# Patient Record
Sex: MALE | URBAN - METROPOLITAN AREA
[De-identification: names, ages, dates, MRNs, and addresses within clinical notes are randomized per-mention and may not be internally consistent; named-entity substitution may affect disease eponyms.]

---

## 2017-10-16 ENCOUNTER — APPOINTMENT (RX ONLY)
Age: 27
Setting detail: DERMATOLOGY
End: 2017-10-16

## 2017-10-16 DIAGNOSIS — L80 VITILIGO: ICD-10-CM

## 2017-10-16 DIAGNOSIS — D22 MELANOCYTIC NEVI: ICD-10-CM

## 2017-10-16 PROBLEM — D22.5 MELANOCYTIC NEVI OF TRUNK: Status: ACTIVE | Noted: 2017-10-16

## 2017-10-16 PROCEDURE — ? COUNSELING

## 2017-10-16 PROCEDURE — 99203 OFFICE O/P NEW LOW 30 MIN: CPT

## 2017-10-16 PROCEDURE — ? OBSERVATION

## 2017-10-16 PROCEDURE — ? DEFER

## 2017-10-16 PROCEDURE — ? PATIENT SPECIFIC COUNSELING

## 2017-10-16 ASSESSMENT — LOCATION DETAILED DESCRIPTION DERM
LOCATION DETAILED: PERIUMBILICAL SKIN
LOCATION DETAILED: SUPRAPUBIC SKIN
LOCATION DETAILED: LEFT PROXIMAL PRETIBIAL REGION
LOCATION DETAILED: PERIUMBILICAL SKIN

## 2017-10-16 ASSESSMENT — LOCATION SIMPLE DESCRIPTION DERM
LOCATION SIMPLE: ABDOMEN
LOCATION SIMPLE: GROIN
LOCATION SIMPLE: ABDOMEN
LOCATION SIMPLE: LEFT PRETIBIAL REGION

## 2017-10-16 ASSESSMENT — LOCATION ZONE DERM
LOCATION ZONE: LEG
LOCATION ZONE: TRUNK
LOCATION ZONE: TRUNK

## 2017-10-16 NOTE — HPI: RASH
How Severe Is Your Rash?: moderate
Is This A New Presentation, Or A Follow-Up?: Rash
Additional History: Patient is also here for a full skin exam

## 2017-10-16 NOTE — PROCEDURE: PATIENT SPECIFIC COUNSELING
Detail Level: Detailed
I have discussed excimer laser, the potential risk of skin cancer and the lack of data for excimer specifically. The risk is lower than other forms of UV because the only sights that are treated are the affected sites. \\n\\nI recommend being screened for diabetes and thyroid disease as Vitiligo increases risk of other autoimmune disorders.

## 2017-10-19 ENCOUNTER — APPOINTMENT (RX ONLY)
Age: 27
Setting detail: DERMATOLOGY
End: 2017-10-19

## 2017-10-19 DIAGNOSIS — L80 VITILIGO: ICD-10-CM

## 2017-10-19 PROCEDURE — ? XTRAC LASER

## 2017-10-19 PROCEDURE — 96922 EXCIMER LSR PSRIASIS>500SQCM: CPT

## 2017-10-19 ASSESSMENT — LOCATION ZONE DERM
LOCATION ZONE: TRUNK
LOCATION ZONE: LEG
LOCATION ZONE: PENIS
LOCATION ZONE: GENITALIA

## 2017-10-19 ASSESSMENT — LOCATION SIMPLE DESCRIPTION DERM
LOCATION SIMPLE: RIGHT THIGH
LOCATION SIMPLE: SCROTUM
LOCATION SIMPLE: GROIN
LOCATION SIMPLE: PENIS
LOCATION SIMPLE: LEFT PRETIBIAL REGION
LOCATION SIMPLE: RIGHT PRETIBIAL REGION

## 2017-10-19 ASSESSMENT — LOCATION DETAILED DESCRIPTION DERM
LOCATION DETAILED: LEFT DISTAL PRETIBIAL REGION
LOCATION DETAILED: RIGHT ANTERIOR PROXIMAL THIGH
LOCATION DETAILED: RIGHT DORSAL SHAFT OF PENIS
LOCATION DETAILED: SUPRAPUBIC SKIN
LOCATION DETAILED: RIGHT DISTAL PRETIBIAL REGION
LOCATION DETAILED: RIGHT ANTERIOR SCROTUM

## 2017-10-19 NOTE — PROCEDURE: XTRAC LASER
Detail Level: Simple
Patient Id: [id number]
Treatment Number: 1
Dose Setting #2 (Mj/Cm2): 200
Total Square Area In Cm2 (Required For Proper Billing): 606
Total Pulses (Will Not Render If 0): 0
Total Energy In Joules: 120.80
Location #1: legs
Post-Care Instructions: I reviewed with the patient in detail post-care instructions. Patient should stay away from the sun and wear sun protection until treated areas are fully healed.
Consent: see scanned.
Location #2: groin

## 2017-10-23 ENCOUNTER — APPOINTMENT (RX ONLY)
Age: 27
Setting detail: DERMATOLOGY
End: 2017-10-23

## 2017-10-23 DIAGNOSIS — L80 VITILIGO: ICD-10-CM

## 2017-10-23 PROCEDURE — 96920 EXCIMER LSR PSRIASIS<250SQCM: CPT

## 2017-10-23 PROCEDURE — ? XTRAC LASER

## 2017-10-23 ASSESSMENT — LOCATION DETAILED DESCRIPTION DERM
LOCATION DETAILED: LEFT DISTAL PRETIBIAL REGION
LOCATION DETAILED: RIGHT ANTERIOR PROXIMAL THIGH
LOCATION DETAILED: RIGHT DISTAL PRETIBIAL REGION

## 2017-10-23 ASSESSMENT — LOCATION SIMPLE DESCRIPTION DERM
LOCATION SIMPLE: LEFT PRETIBIAL REGION
LOCATION SIMPLE: RIGHT THIGH
LOCATION SIMPLE: RIGHT PRETIBIAL REGION

## 2017-10-23 ASSESSMENT — LOCATION ZONE DERM: LOCATION ZONE: LEG

## 2017-10-23 NOTE — PROCEDURE: XTRAC LASER
Consent: see scanned.
Detail Level: Simple
Post-Care Instructions: I reviewed with the patient in detail post-care instructions. Patient should stay away from the sun and wear sun protection until treated areas are fully healed.
Dose Setting #1 (Mj/Cm2): 250
Total Square Area In Cm2 (Required For Proper Billing): 192
Total Energy In Joules: 48
Total Pulses (Will Not Render If 0): 0
Location #1: legs
Treatment Number: 2
Patient Id: [id number]

## 2017-11-09 ENCOUNTER — APPOINTMENT (RX ONLY)
Age: 27
Setting detail: DERMATOLOGY
End: 2017-11-09

## 2017-11-09 DIAGNOSIS — L80 VITILIGO: ICD-10-CM

## 2017-11-09 PROCEDURE — ? XTRAC LASER

## 2017-11-09 PROCEDURE — 96921 EXCIMER LSR PSRIASIS 250-500: CPT

## 2017-11-09 ASSESSMENT — LOCATION ZONE DERM
LOCATION ZONE: GENITALIA
LOCATION ZONE: LEG

## 2017-11-09 ASSESSMENT — LOCATION SIMPLE DESCRIPTION DERM
LOCATION SIMPLE: RIGHT PRETIBIAL REGION
LOCATION SIMPLE: LEFT PRETIBIAL REGION
LOCATION SIMPLE: RIGHT THIGH
LOCATION SIMPLE: SCROTUM

## 2017-11-09 ASSESSMENT — LOCATION DETAILED DESCRIPTION DERM
LOCATION DETAILED: RIGHT DISTAL PRETIBIAL REGION
LOCATION DETAILED: RIGHT ANTERIOR PROXIMAL THIGH
LOCATION DETAILED: RIGHT ANTERIOR SCROTUM
LOCATION DETAILED: LEFT DISTAL PRETIBIAL REGION

## 2017-11-09 NOTE — PROCEDURE: XTRAC LASER
Consent: see scanned.
Patient Id: [id number]
Total Energy In Joules: 131.20
Treatment Number: 3
Total Square Area In Cm2 (Required For Proper Billing): 483
Post-Care Instructions: I reviewed with the patient in detail post-care instructions. Patient should stay away from the sun and wear sun protection until treated areas are fully healed.
Dose Setting #1 (Mj/Cm2): 300
Location #2: groin
Dose Setting #2 (Mj/Cm2): 250
Detail Level: Simple
Total Pulses (Will Not Render If 0): 0
Location #1: legs

## 2017-11-13 ENCOUNTER — APPOINTMENT (RX ONLY)
Age: 27
Setting detail: DERMATOLOGY
End: 2017-11-13

## 2017-11-13 DIAGNOSIS — B07.8 OTHER VIRAL WARTS: ICD-10-CM

## 2017-11-13 DIAGNOSIS — R21 RASH AND OTHER NONSPECIFIC SKIN ERUPTION: ICD-10-CM

## 2017-11-13 DIAGNOSIS — L80 VITILIGO: ICD-10-CM

## 2017-11-13 PROCEDURE — ? XTRAC LASER

## 2017-11-13 PROCEDURE — 87220 TISSUE EXAM FOR FUNGI: CPT

## 2017-11-13 PROCEDURE — 96921 EXCIMER LSR PSRIASIS 250-500: CPT

## 2017-11-13 PROCEDURE — ? DIAGNOSIS COMMENT

## 2017-11-13 PROCEDURE — ? KOH PREP

## 2017-11-13 PROCEDURE — ? COUNSELING

## 2017-11-13 PROCEDURE — 99214 OFFICE O/P EST MOD 30 MIN: CPT | Mod: 25

## 2017-11-13 PROCEDURE — ? PATIENT SPECIFIC COUNSELING

## 2017-11-13 PROCEDURE — ? PRESCRIPTION

## 2017-11-13 PROCEDURE — ? OBSERVATION

## 2017-11-13 ASSESSMENT — AREA OF WARTS IN CM2: TOTAL AREA OF ALL WARTS IN CM2: 1

## 2017-11-13 ASSESSMENT — LOCATION ZONE DERM
LOCATION ZONE: GENITALIA
LOCATION ZONE: LEG
LOCATION ZONE: FEET
LOCATION ZONE: TRUNK

## 2017-11-13 ASSESSMENT — LOCATION SIMPLE DESCRIPTION DERM
LOCATION SIMPLE: SCROTUM
LOCATION SIMPLE: LEFT SOLE
LOCATION SIMPLE: RIGHT THIGH
LOCATION SIMPLE: LEFT PRETIBIAL REGION
LOCATION SIMPLE: ABDOMEN
LOCATION SIMPLE: RIGHT PRETIBIAL REGION

## 2017-11-13 ASSESSMENT — LOCATION DETAILED DESCRIPTION DERM
LOCATION DETAILED: PERIUMBILICAL SKIN
LOCATION DETAILED: LEFT DISTAL PRETIBIAL REGION
LOCATION DETAILED: RIGHT ANTERIOR PROXIMAL THIGH
LOCATION DETAILED: RIGHT ANTERIOR SCROTUM
LOCATION DETAILED: RIGHT DISTAL PRETIBIAL REGION
LOCATION DETAILED: LEFT SOLE

## 2017-11-13 ASSESSMENT — TOTAL NUMBER OF VERRUCA VULGARIS: # OF LESIONS?: 1

## 2017-11-13 NOTE — PROCEDURE: XTRAC LASER
Total Pulses (Will Not Render If 0): 0
Total Energy In Joules: 140.40
Patient Id: [id number]
Detail Level: Simple
Post-Care Instructions: I reviewed with the patient in detail post-care instructions. Patient should stay away from the sun and wear sun protection until treated areas are fully healed.
Dose Setting #1 (Mj/Cm2): 400
Dose Setting #2 (Mj/Cm2): 250
Consent: see scanned.
Total Square Area In Cm2 (Required For Proper Billing): 432
Location #1: legs
Location #2: groin
Treatment Number: 4

## 2017-11-13 NOTE — PROCEDURE: DIAGNOSIS COMMENT
Detail Level: Detailed
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 He will use Calamine lotion topically to soothe the skin.  He will call me (cell phone #) provided if he worsens, develops any new symptoms or systemic signs such as fever.

## 2017-11-14 RX ORDER — PREDNISONE 10 MG/1
TABLET ORAL
Qty: 84 | Refills: 0 | Status: ERX | COMMUNITY
Start: 2017-11-14

## 2017-11-14 RX ADMIN — PREDNISONE: 10 TABLET ORAL at 00:35

## 2017-11-16 ENCOUNTER — APPOINTMENT (RX ONLY)
Age: 27
Setting detail: DERMATOLOGY
End: 2017-11-16

## 2017-11-16 DIAGNOSIS — L80 VITILIGO: ICD-10-CM

## 2017-11-16 PROCEDURE — 96921 EXCIMER LSR PSRIASIS 250-500: CPT

## 2017-11-16 PROCEDURE — ? XTRAC LASER

## 2017-11-16 ASSESSMENT — LOCATION SIMPLE DESCRIPTION DERM
LOCATION SIMPLE: RIGHT THIGH
LOCATION SIMPLE: LEFT PRETIBIAL REGION
LOCATION SIMPLE: SCROTUM
LOCATION SIMPLE: RIGHT PRETIBIAL REGION

## 2017-11-16 ASSESSMENT — LOCATION ZONE DERM
LOCATION ZONE: LEG
LOCATION ZONE: GENITALIA

## 2017-11-16 ASSESSMENT — LOCATION DETAILED DESCRIPTION DERM
LOCATION DETAILED: LEFT DISTAL PRETIBIAL REGION
LOCATION DETAILED: RIGHT ANTERIOR SCROTUM
LOCATION DETAILED: RIGHT ANTERIOR PROXIMAL THIGH
LOCATION DETAILED: RIGHT DISTAL PRETIBIAL REGION

## 2017-11-16 NOTE — PROCEDURE: XTRAC LASER
Dose Setting #1 (Mj/Cm2): 450
Consent: see scanned.
Total Pulses (Will Not Render If 0): 0
Location #2: groin
Dose Setting #2 (Mj/Cm2): 250
Total Square Area In Cm2 (Required For Proper Billing): 364
Patient Id: [id number]
Treatment Number: 5
Detail Level: Simple
Post-Care Instructions: I reviewed with the patient in detail post-care instructions. Patient should stay away from the sun and wear sun protection until treated areas are fully healed.
Total Energy In Joules: 169
Location #1: legs

## 2017-11-21 ENCOUNTER — APPOINTMENT (RX ONLY)
Age: 27
Setting detail: DERMATOLOGY
End: 2017-11-21

## 2017-11-21 DIAGNOSIS — L80 VITILIGO: ICD-10-CM

## 2017-11-21 PROCEDURE — 96921 EXCIMER LSR PSRIASIS 250-500: CPT

## 2017-11-21 PROCEDURE — ? XTRAC LASER

## 2017-11-21 ASSESSMENT — LOCATION ZONE DERM
LOCATION ZONE: GENITALIA
LOCATION ZONE: LEG

## 2017-11-21 ASSESSMENT — LOCATION DETAILED DESCRIPTION DERM
LOCATION DETAILED: RIGHT ANTERIOR PROXIMAL THIGH
LOCATION DETAILED: RIGHT DISTAL PRETIBIAL REGION
LOCATION DETAILED: RIGHT ANTERIOR SCROTUM
LOCATION DETAILED: LEFT DISTAL PRETIBIAL REGION

## 2017-11-21 NOTE — PROCEDURE: XTRAC LASER
Total Energy In Joules: 150.60
Total Square Area In Cm2 (Required For Proper Billing): 372
Treatment Number: 6
Total Pulses (Will Not Render If 0): 0
Post-Care Instructions: I reviewed with the patient in detail post-care instructions. Patient should stay away from the sun and wear sun protection until treated areas are fully healed.
Location #2: groin
Dose Setting #1 (Mj/Cm2): 550
Location #1: legs
Patient Id: [id number]
Detail Level: Simple
Consent: see scanned.
Dose Setting #2 (Mj/Cm2): 250

## 2017-11-22 ENCOUNTER — APPOINTMENT (RX ONLY)
Age: 27
Setting detail: DERMATOLOGY
End: 2017-11-22

## 2017-11-22 DIAGNOSIS — L80 VITILIGO: ICD-10-CM

## 2017-11-22 PROCEDURE — ? XTRAC LASER

## 2017-11-22 PROCEDURE — 96921 EXCIMER LSR PSRIASIS 250-500: CPT

## 2017-11-22 ASSESSMENT — LOCATION ZONE DERM
LOCATION ZONE: GENITALIA
LOCATION ZONE: LEG

## 2017-11-22 ASSESSMENT — LOCATION DETAILED DESCRIPTION DERM
LOCATION DETAILED: RIGHT ANTERIOR PROXIMAL THIGH
LOCATION DETAILED: LEFT DISTAL PRETIBIAL REGION
LOCATION DETAILED: RIGHT DISTAL PRETIBIAL REGION
LOCATION DETAILED: RIGHT ANTERIOR SCROTUM

## 2017-11-22 ASSESSMENT — LOCATION SIMPLE DESCRIPTION DERM
LOCATION SIMPLE: RIGHT THIGH
LOCATION SIMPLE: SCROTUM
LOCATION SIMPLE: LEFT PRETIBIAL REGION
LOCATION SIMPLE: RIGHT PRETIBIAL REGION

## 2017-11-22 NOTE — PROCEDURE: XTRAC LASER
Dose Setting #3 (Mj/Cm2): 250
Dose Setting #2 (Mj/Cm2): 500
Location #3: groin
Location #2: suprapubic
Consent: see scanned.
Total Pulses (Will Not Render If 0): 0
Post-Care Instructions: I reviewed with the patient in detail post-care instructions. Patient should stay away from the sun and wear sun protection until treated areas are fully healed.
Patient Id: [id number]
Treatment Number: 7
Dose Setting #1 (Mj/Cm2): 550
Total Energy In Joules: 150.60
Detail Level: Simple
Location #1: legs
Total Square Area In Cm2 (Required For Proper Billing): 372

## 2017-11-30 ENCOUNTER — APPOINTMENT (RX ONLY)
Age: 27
Setting detail: DERMATOLOGY
End: 2017-11-30

## 2017-11-30 DIAGNOSIS — L80 VITILIGO: ICD-10-CM

## 2017-11-30 PROCEDURE — 96921 EXCIMER LSR PSRIASIS 250-500: CPT

## 2017-11-30 PROCEDURE — ? XTRAC LASER

## 2017-11-30 ASSESSMENT — LOCATION ZONE DERM
LOCATION ZONE: GENITALIA
LOCATION ZONE: LEG

## 2017-11-30 ASSESSMENT — LOCATION SIMPLE DESCRIPTION DERM
LOCATION SIMPLE: LEFT PRETIBIAL REGION
LOCATION SIMPLE: RIGHT PRETIBIAL REGION
LOCATION SIMPLE: SCROTUM
LOCATION SIMPLE: RIGHT THIGH

## 2017-11-30 ASSESSMENT — LOCATION DETAILED DESCRIPTION DERM
LOCATION DETAILED: RIGHT DISTAL PRETIBIAL REGION
LOCATION DETAILED: RIGHT ANTERIOR SCROTUM
LOCATION DETAILED: LEFT DISTAL PRETIBIAL REGION
LOCATION DETAILED: RIGHT ANTERIOR PROXIMAL THIGH

## 2017-11-30 NOTE — PROCEDURE: XTRAC LASER
Consent: see scanned.
Detail Level: Simple
Treatment Number: 8
Patient Id: [id number]
Dose Setting #2 (Mj/Cm2): 500
Post-Care Instructions: I reviewed with the patient in detail post-care instructions. Patient should stay away from the sun and wear sun protection until treated areas are fully healed.
Location #2: suprapubic
Location #1: legs
Total Pulses (Will Not Render If 0): 0
Total Square Area In Cm2 (Required For Proper Billing): 384
Total Energy In Joules: 138.00
Dose Setting #3 (Mj/Cm2): 200
Location #3: groin

## 2017-12-05 ENCOUNTER — APPOINTMENT (RX ONLY)
Age: 27
Setting detail: DERMATOLOGY
End: 2017-12-05

## 2017-12-05 DIAGNOSIS — L80 VITILIGO: ICD-10-CM

## 2017-12-05 PROCEDURE — 96921 EXCIMER LSR PSRIASIS 250-500: CPT

## 2017-12-05 PROCEDURE — ? XTRAC LASER

## 2017-12-05 ASSESSMENT — LOCATION SIMPLE DESCRIPTION DERM
LOCATION SIMPLE: LEFT PRETIBIAL REGION
LOCATION SIMPLE: SCROTUM
LOCATION SIMPLE: RIGHT THIGH
LOCATION SIMPLE: RIGHT PRETIBIAL REGION

## 2017-12-05 ASSESSMENT — LOCATION ZONE DERM
LOCATION ZONE: GENITALIA
LOCATION ZONE: LEG

## 2017-12-05 NOTE — PROCEDURE: XTRAC LASER
Dose Setting #3 (Mj/Cm2): 200
Treatment Number: 8
Location #2: suprapubic
Location #3: groin
Location #1: legs
Dose Setting #1 (Mj/Cm2): 550
Patient Id: [id number]
Consent: see scanned.
Total Energy In Joules: 182.20
Detail Level: Simple
Total Pulses (Will Not Render If 0): 0
Post-Care Instructions: I reviewed with the patient in detail post-care instructions. Patient should stay away from the sun and wear sun protection until treated areas are fully healed.
Total Square Area In Cm2 (Required For Proper Billing): 481

## 2017-12-07 ENCOUNTER — APPOINTMENT (RX ONLY)
Age: 27
Setting detail: DERMATOLOGY
End: 2017-12-07

## 2017-12-07 DIAGNOSIS — L80 VITILIGO: ICD-10-CM

## 2017-12-07 PROCEDURE — ? XTRAC LASER

## 2017-12-07 PROCEDURE — 96922 EXCIMER LSR PSRIASIS>500SQCM: CPT

## 2017-12-07 ASSESSMENT — LOCATION SIMPLE DESCRIPTION DERM
LOCATION SIMPLE: SCROTUM
LOCATION SIMPLE: RIGHT PRETIBIAL REGION
LOCATION SIMPLE: LEFT PRETIBIAL REGION
LOCATION SIMPLE: RIGHT THIGH

## 2017-12-07 ASSESSMENT — LOCATION DETAILED DESCRIPTION DERM
LOCATION DETAILED: LEFT DISTAL PRETIBIAL REGION
LOCATION DETAILED: RIGHT ANTERIOR SCROTUM
LOCATION DETAILED: RIGHT DISTAL PRETIBIAL REGION
LOCATION DETAILED: RIGHT ANTERIOR PROXIMAL THIGH

## 2017-12-07 ASSESSMENT — LOCATION ZONE DERM
LOCATION ZONE: GENITALIA
LOCATION ZONE: LEG

## 2017-12-07 NOTE — PROCEDURE: XTRAC LASER
Patient Id: [id number]
Dose Setting #2 (Mj/Cm2): 550
Detail Level: Simple
Dose Setting #3 (Mj/Cm2): 200
Total Pulses (Will Not Render If 0): 0
Post-Care Instructions: I reviewed with the patient in detail post-care instructions. Patient should stay away from the sun and wear sun protection until treated areas are fully healed.
Location #2: suprapubic
Total Energy In Joules: 194.00
Location #3: groin
Location #1: legs
Treatment Number: 10
Consent: see scanned.
Total Square Area In Cm2 (Required For Proper Billing): 501

## 2017-12-11 ENCOUNTER — APPOINTMENT (RX ONLY)
Age: 27
Setting detail: DERMATOLOGY
End: 2017-12-11

## 2017-12-11 DIAGNOSIS — L81.0 POSTINFLAMMATORY HYPERPIGMENTATION: ICD-10-CM

## 2017-12-11 DIAGNOSIS — L91.0 HYPERTROPHIC SCAR: ICD-10-CM

## 2017-12-11 DIAGNOSIS — L80 VITILIGO: ICD-10-CM | Status: IMPROVED

## 2017-12-11 DIAGNOSIS — R21 RASH AND OTHER NONSPECIFIC SKIN ERUPTION: ICD-10-CM | Status: RESOLVED

## 2017-12-11 PROCEDURE — ? XTRAC LASER

## 2017-12-11 PROCEDURE — ? COUNSELING

## 2017-12-11 PROCEDURE — ? OBSERVATION

## 2017-12-11 PROCEDURE — 96921 EXCIMER LSR PSRIASIS 250-500: CPT

## 2017-12-11 PROCEDURE — 11900 INJECT SKIN LESIONS </W 7: CPT

## 2017-12-11 PROCEDURE — 99214 OFFICE O/P EST MOD 30 MIN: CPT | Mod: 25

## 2017-12-11 PROCEDURE — ? INTRALESIONAL KENALOG

## 2017-12-11 ASSESSMENT — LOCATION SIMPLE DESCRIPTION DERM
LOCATION SIMPLE: LEFT THIGH
LOCATION SIMPLE: LEFT PRETIBIAL REGION
LOCATION SIMPLE: RIGHT PRETIBIAL REGION
LOCATION SIMPLE: ABDOMEN
LOCATION SIMPLE: RIGHT THIGH
LOCATION SIMPLE: LEFT BUTTOCK

## 2017-12-11 ASSESSMENT — LOCATION DETAILED DESCRIPTION DERM
LOCATION DETAILED: LEFT PROXIMAL PRETIBIAL REGION
LOCATION DETAILED: LEFT DISTAL PRETIBIAL REGION
LOCATION DETAILED: LEFT BUTTOCK
LOCATION DETAILED: RIGHT DISTAL PRETIBIAL REGION
LOCATION DETAILED: PERIUMBILICAL SKIN
LOCATION DETAILED: LEFT ANTERIOR PROXIMAL THIGH
LOCATION DETAILED: RIGHT ANTERIOR PROXIMAL THIGH

## 2017-12-11 ASSESSMENT — LOCATION ZONE DERM
LOCATION ZONE: LEG
LOCATION ZONE: TRUNK

## 2017-12-11 NOTE — PROCEDURE: INTRALESIONAL KENALOG
Concentration Of Kenalog Solution Injected (Mg/Ml): 5.0
Total Volume (Ccs): 0.1
Administered By (Optional): Xi Penaloza
Ndc# For Kenalog Only: 9486-3900-87
Detail Level: Detailed
Consent: The risks of atrophy were reviewed with the patient.
Medical Necessity Clause: This procedure was medically necessary because the lesions that were treated were:
X Size Of Lesion In Cm (Optional): 0
Include Z78.9 (Other Specified Conditions Influencing Health Status) As An Associated Diagnosis?: No
Kenalog Preparation: Kenalog with 1% lidocaine with epinephrine
Expiration Date For Kenalog (Optional): Aug 2018
Lot # For Kenalog (Optional): [id number]

## 2017-12-11 NOTE — PROCEDURE: OBSERVATION
Size Of Lesion In Cm (Optional): 0
Detail Level: Detailed
Body Location Override (Optional - Billing Will Still Be Based On Selected Body Map Location If Applicable): left distal pretibial region
Body Location Override (Optional - Billing Will Still Be Based On Selected Body Map Location If Applicable): right distal pretibial region
Detail Level: Zone
Body Location Override (Optional - Billing Will Still Be Based On Selected Body Map Location If Applicable): right anterior proximal thigh

## 2017-12-11 NOTE — PROCEDURE: XTRAC LASER
Dose Setting #2 (Mj/Cm2): 550
Location #1: legs
Treatment Number: 11
Consent: see scanned.
Total Square Area In Cm2 (Required For Proper Billing): 480
Total Pulses (Will Not Render If 0): 0
Post-Care Instructions: I reviewed with the patient in detail post-care instructions. Patient should stay away from the sun and wear sun protection until treated areas are fully healed.
Total Energy In Joules: 213.40
Dose Setting #1 (Mj/Cm2): 600
Patient Id: [id number]
Location #2: suprapubic
Dose Setting #3 (Mj/Cm2): 250
Location #3: groin
Detail Level: Simple

## 2017-12-18 ENCOUNTER — APPOINTMENT (RX ONLY)
Age: 27
Setting detail: DERMATOLOGY
End: 2017-12-18

## 2017-12-18 DIAGNOSIS — L80 VITILIGO: ICD-10-CM

## 2017-12-18 PROCEDURE — ? XTRAC LASER

## 2017-12-18 PROCEDURE — 96922 EXCIMER LSR PSRIASIS>500SQCM: CPT

## 2017-12-18 PROCEDURE — ? OBSERVATION

## 2017-12-18 ASSESSMENT — LOCATION ZONE DERM: LOCATION ZONE: LEG

## 2017-12-18 ASSESSMENT — LOCATION DETAILED DESCRIPTION DERM
LOCATION DETAILED: LEFT DISTAL PRETIBIAL REGION
LOCATION DETAILED: RIGHT ANTERIOR PROXIMAL THIGH
LOCATION DETAILED: LEFT PROXIMAL PRETIBIAL REGION
LOCATION DETAILED: RIGHT DISTAL PRETIBIAL REGION

## 2017-12-18 NOTE — PROCEDURE: XTRAC LASER
Detail Level: Simple
Location #2: suprapubic
Dose Setting #1 (Mj/Cm2): 600
Patient Id: [id number]
Consent: see scanned.
Post-Care Instructions: I reviewed with the patient in detail post-care instructions. Patient should stay away from the sun and wear sun protection until treated areas are fully healed.
Dose Setting #2 (Mj/Cm2): 500
Total Energy In Joules: 262.40
Total Pulses (Will Not Render If 0): 0
Location #1: legs
Total Square Area In Cm2 (Required For Proper Billing): 571
Dose Setting #3 (Mj/Cm2): 300
Treatment Number: 12
Location #3: groin

## 2017-12-18 NOTE — PROCEDURE: OBSERVATION
X Size Of Lesion In Cm (Optional): 0
Detail Level: Detailed
Body Location Override (Optional - Billing Will Still Be Based On Selected Body Map Location If Applicable): left distal pretibial region
Body Location Override (Optional - Billing Will Still Be Based On Selected Body Map Location If Applicable): right distal pretibial region
Body Location Override (Optional - Billing Will Still Be Based On Selected Body Map Location If Applicable): right anterior proximal thigh

## 2017-12-21 ENCOUNTER — APPOINTMENT (RX ONLY)
Age: 27
Setting detail: DERMATOLOGY
End: 2017-12-21

## 2017-12-21 DIAGNOSIS — L80 VITILIGO: ICD-10-CM

## 2017-12-21 PROCEDURE — 96921 EXCIMER LSR PSRIASIS 250-500: CPT

## 2017-12-21 PROCEDURE — ? XTRAC LASER

## 2017-12-21 ASSESSMENT — LOCATION DETAILED DESCRIPTION DERM
LOCATION DETAILED: RIGHT ANTERIOR PROXIMAL THIGH
LOCATION DETAILED: LEFT DISTAL PRETIBIAL REGION
LOCATION DETAILED: RIGHT DISTAL PRETIBIAL REGION

## 2017-12-21 ASSESSMENT — LOCATION SIMPLE DESCRIPTION DERM
LOCATION SIMPLE: LEFT PRETIBIAL REGION
LOCATION SIMPLE: RIGHT PRETIBIAL REGION
LOCATION SIMPLE: RIGHT THIGH

## 2017-12-21 ASSESSMENT — LOCATION ZONE DERM: LOCATION ZONE: LEG

## 2017-12-21 NOTE — PROCEDURE: XTRAC LASER
Total Square Area In Cm2 (Required For Proper Billing): 332
Dose Setting #1 (Mj/Cm2): 600
Total Energy In Joules: 139.20
Dose Setting #2 (Mj/Cm2): 550
Consent: see scanned.
Location #3: groin
Detail Level: Simple
Location #2: right medial thigh
Total Pulses (Will Not Render If 0): 0
Treatment Number: 13
Location #1: legs
Dose Setting #3 (Mj/Cm2): 300
Patient Id: [id number]
Post-Care Instructions: I reviewed with the patient in detail post-care instructions. Patient should stay away from the sun and wear sun protection until treated areas are fully healed.

## 2018-01-04 ENCOUNTER — APPOINTMENT (RX ONLY)
Age: 28
Setting detail: DERMATOLOGY
End: 2018-01-04

## 2018-01-04 DIAGNOSIS — L80 VITILIGO: ICD-10-CM

## 2018-01-04 PROCEDURE — 96921 EXCIMER LSR PSRIASIS 250-500: CPT

## 2018-01-04 PROCEDURE — ? XTRAC LASER

## 2018-01-04 ASSESSMENT — LOCATION DETAILED DESCRIPTION DERM
LOCATION DETAILED: LEFT DISTAL PRETIBIAL REGION
LOCATION DETAILED: RIGHT DISTAL PRETIBIAL REGION
LOCATION DETAILED: RIGHT ANTERIOR PROXIMAL THIGH

## 2018-01-04 ASSESSMENT — LOCATION ZONE DERM: LOCATION ZONE: LEG

## 2018-01-04 NOTE — PROCEDURE: XTRAC LASER
Location #4: suprapubic region
Consent: see scanned.
Location #1: legs
Dose Setting #3 (Mj/Cm2): 300
Dose Settinge #4 (Mj/Cm2): 400
Location #2: right medial thigh
Dose Setting #1 (Mj/Cm2): 600
Post-Care Instructions: I reviewed with the patient in detail post-care instructions. Patient should stay away from the sun and wear sun protection until treated areas are fully healed.
Total Energy In Joules: 225
Detail Level: Simple
Treatment Number: 14
Patient Id: [id number]
Total Pulses (Will Not Render If 0): 0
Location #3: groin
Total Square Area In Cm2 (Required For Proper Billing): 500

## 2018-01-09 ENCOUNTER — APPOINTMENT (RX ONLY)
Age: 28
Setting detail: DERMATOLOGY
End: 2018-01-09

## 2018-01-09 DIAGNOSIS — L80 VITILIGO: ICD-10-CM

## 2018-01-09 PROCEDURE — 96921 EXCIMER LSR PSRIASIS 250-500: CPT

## 2018-01-09 PROCEDURE — ? XTRAC LASER

## 2018-01-09 ASSESSMENT — LOCATION ZONE DERM: LOCATION ZONE: LEG

## 2018-01-09 ASSESSMENT — LOCATION DETAILED DESCRIPTION DERM
LOCATION DETAILED: RIGHT DISTAL PRETIBIAL REGION
LOCATION DETAILED: RIGHT ANTERIOR PROXIMAL THIGH
LOCATION DETAILED: LEFT DISTAL PRETIBIAL REGION

## 2018-01-09 NOTE — PROCEDURE: XTRAC LASER
Location #4: suprapubic region
Dose Settinge #4 (Mj/Cm2): 400
Consent: see scanned.
Patient Id: [id number]
Total Pulses (Will Not Render If 0): 0
Dose Setting #3 (Mj/Cm2): 300
Dose Setting #2 (Mj/Cm2): 550
Dose Setting #1 (Mj/Cm2): 600
Total Square Area In Cm2 (Required For Proper Billing): 428
Location #2: right medial thigh
Detail Level: Simple
Location #3: groin
Location #1: legs
Total Energy In Joules: 212.00
Treatment Number: 15
Post-Care Instructions: I reviewed with the patient in detail post-care instructions. Patient should stay away from the sun and wear sun protection until treated areas are fully healed.

## 2018-01-11 ENCOUNTER — APPOINTMENT (RX ONLY)
Age: 28
Setting detail: DERMATOLOGY
End: 2018-01-11

## 2018-01-11 DIAGNOSIS — L80 VITILIGO: ICD-10-CM

## 2018-01-11 PROCEDURE — ? XTRAC LASER

## 2018-01-11 PROCEDURE — 96921 EXCIMER LSR PSRIASIS 250-500: CPT

## 2018-01-11 ASSESSMENT — LOCATION SIMPLE DESCRIPTION DERM
LOCATION SIMPLE: RIGHT THIGH
LOCATION SIMPLE: LEFT PRETIBIAL REGION
LOCATION SIMPLE: RIGHT PRETIBIAL REGION

## 2018-01-11 ASSESSMENT — LOCATION ZONE DERM: LOCATION ZONE: LEG

## 2018-01-11 NOTE — PROCEDURE: XTRAC LASER
Location #4: suprapubic region
Dose Settinge #4 (Mj/Cm2): 400
Post-Care Instructions: I reviewed with the patient in detail post-care instructions. Patient should stay away from the sun and wear sun protection until treated areas are fully healed.
Dose Setting #1 (Mj/Cm2): 700
Dose Setting #3 (Mj/Cm2): 300
Location #2: right medial thigh
Consent: see scanned.
Location #1: legs
Total Square Area In Cm2 (Required For Proper Billing): 428
Detail Level: Simple
Patient Id: [id number]
Dose Setting #2 (Mj/Cm2): 550
Total Pulses (Will Not Render If 0): 0
Treatment Number: 16
Location #3: groin
Total Energy In Joules: 218.00

## 2018-01-16 ENCOUNTER — APPOINTMENT (RX ONLY)
Age: 28
Setting detail: DERMATOLOGY
End: 2018-01-16

## 2018-01-16 DIAGNOSIS — L80 VITILIGO: ICD-10-CM

## 2018-01-16 PROCEDURE — 96921 EXCIMER LSR PSRIASIS 250-500: CPT

## 2018-01-16 PROCEDURE — ? XTRAC LASER

## 2018-01-16 ASSESSMENT — LOCATION SIMPLE DESCRIPTION DERM
LOCATION SIMPLE: RIGHT THIGH
LOCATION SIMPLE: RIGHT PRETIBIAL REGION
LOCATION SIMPLE: LEFT PRETIBIAL REGION

## 2018-01-16 ASSESSMENT — LOCATION ZONE DERM: LOCATION ZONE: LEG

## 2018-01-16 NOTE — PROCEDURE: XTRAC LASER
Dose Setting #3 (Mj/Cm2): 300
Location #3: groin
Dose Settinge #4 (Mj/Cm2): 400
Total Pulses (Will Not Render If 0): 0
Detail Level: Simple
Location #1: legs
Dose Setting #1 (Mj/Cm2): 750
Patient Id: [id number]
Total Energy In Joules: 225.20
Dose Setting #2 (Mj/Cm2): 550
Consent: see scanned.
Location #2: right medial thigh
Treatment Number: 17
Total Square Area In Cm2 (Required For Proper Billing): 412
Location #4: suprapubic region
Post-Care Instructions: I reviewed with the patient in detail post-care instructions. Patient should stay away from the sun and wear sun protection until treated areas are fully healed.

## 2018-01-18 ENCOUNTER — APPOINTMENT (RX ONLY)
Age: 28
Setting detail: DERMATOLOGY
End: 2018-01-18

## 2018-01-18 DIAGNOSIS — L80 VITILIGO: ICD-10-CM

## 2018-01-18 PROCEDURE — 96921 EXCIMER LSR PSRIASIS 250-500: CPT

## 2018-01-18 PROCEDURE — ? XTRAC LASER

## 2018-01-18 ASSESSMENT — LOCATION SIMPLE DESCRIPTION DERM
LOCATION SIMPLE: RIGHT PRETIBIAL REGION
LOCATION SIMPLE: RIGHT THIGH
LOCATION SIMPLE: LEFT PRETIBIAL REGION

## 2018-01-18 ASSESSMENT — LOCATION ZONE DERM: LOCATION ZONE: LEG

## 2018-01-18 NOTE — PROCEDURE: XTRAC LASER
Dose Settinge #4 (Mj/Cm2): 400
Dose Setting #2 (Mj/Cm2): 550
Treatment Number: 18
Detail Level: Simple
Consent: see scanned.
Location #1: legs
Total Energy In Joules: 220.8
Location #4: suprapubic region
Dose Setting #3 (Mj/Cm2): 300
Total Pulses (Will Not Render If 0): 0
Location #3: groin
Dose Setting #1 (Mj/Cm2): 750
Total Square Area In Cm2 (Required For Proper Billing): 404
Patient Id: [id number]
Location #2: right medial thigh
Post-Care Instructions: I reviewed with the patient in detail post-care instructions. Patient should stay away from the sun and wear sun protection until treated areas are fully healed.

## 2018-01-23 ENCOUNTER — APPOINTMENT (RX ONLY)
Age: 28
Setting detail: DERMATOLOGY
End: 2018-01-23

## 2018-01-23 DIAGNOSIS — L80 VITILIGO: ICD-10-CM

## 2018-01-23 PROCEDURE — 96921 EXCIMER LSR PSRIASIS 250-500: CPT

## 2018-01-23 PROCEDURE — ? XTRAC LASER

## 2018-01-23 ASSESSMENT — LOCATION ZONE DERM: LOCATION ZONE: LEG

## 2018-01-23 NOTE — PROCEDURE: XTRAC LASER
Consent: see scanned.
Detail Level: Simple
Dose Setting #2 (Mj/Cm2): 550
Patient Id: [id number]
Total Energy In Joules: 220.8
Location #4: suprapubic region
Treatment Number: 18
Total Square Area In Cm2 (Required For Proper Billing): 404
Location #3: groin
Dose Settinge #4 (Mj/Cm2): 400
Location #2: right medial thigh
Location #1: legs
Post-Care Instructions: I reviewed with the patient in detail post-care instructions. Patient should stay away from the sun and wear sun protection until treated areas are fully healed.
Dose Setting #3 (Mj/Cm2): 300
Total Pulses (Will Not Render If 0): 0
Dose Setting #1 (Mj/Cm2): 750

## 2018-01-25 ENCOUNTER — APPOINTMENT (RX ONLY)
Age: 28
Setting detail: DERMATOLOGY
End: 2018-01-25

## 2018-01-25 DIAGNOSIS — L80 VITILIGO: ICD-10-CM

## 2018-01-25 PROCEDURE — 96921 EXCIMER LSR PSRIASIS 250-500: CPT

## 2018-01-25 PROCEDURE — ? XTRAC LASER

## 2018-01-25 ASSESSMENT — LOCATION ZONE DERM: LOCATION ZONE: LEG

## 2018-01-25 NOTE — PROCEDURE: XTRAC LASER
Detail Level: Simple
Location #4: suprapubic region
Post-Care Instructions: I reviewed with the patient in detail post-care instructions. Patient should stay away from the sun and wear sun protection until treated areas are fully healed.
Treatment Number: 20
Dose Setting #3 (Mj/Cm2): 300
Dose Settinge #4 (Mj/Cm2): 400
Consent: see scanned.
Total Energy In Joules: 196.40
Location #1: legs
Total Pulses (Will Not Render If 0): 0
Total Square Area In Cm2 (Required For Proper Billing): 352
Location #3: groin
Location #2: right medial thigh
Dose Setting #2 (Mj/Cm2): 550
Patient Id: [id number]
Dose Setting #1 (Mj/Cm2): 800

## 2018-01-30 ENCOUNTER — APPOINTMENT (RX ONLY)
Age: 28
Setting detail: DERMATOLOGY
End: 2018-01-30

## 2018-01-30 DIAGNOSIS — L80 VITILIGO: ICD-10-CM

## 2018-01-30 PROCEDURE — ? XTRAC LASER

## 2018-01-30 PROCEDURE — 96921 EXCIMER LSR PSRIASIS 250-500: CPT

## 2018-01-30 ASSESSMENT — LOCATION ZONE DERM: LOCATION ZONE: LEG

## 2018-01-30 NOTE — PROCEDURE: XTRAC LASER
Location #1: legs
Dose Setting #1 (Mj/Cm2): 850
Dose Setting #3 (Mj/Cm2): 350
Location #3: groin
Total Square Area In Cm2 (Required For Proper Billing): 408
Treatment Number: 21
Total Energy In Joules: 241.00
Detail Level: Simple
Dose Settinge #4 (Mj/Cm2): 450
Consent: see scanned.
Post-Care Instructions: I reviewed with the patient in detail post-care instructions. Patient should stay away from the sun and wear sun protection until treated areas are fully healed.
Location #2: right medial thigh
Patient Id: [id number]
Location #4: suprapubic region
Total Pulses (Will Not Render If 0): 0
Dose Setting #2 (Mj/Cm2): 600
% Increase/Decrease From Last Treatment: +50%

## 2019-03-19 ENCOUNTER — APPOINTMENT (RX ONLY)
Age: 29
Setting detail: DERMATOLOGY
End: 2019-03-19

## 2019-03-19 DIAGNOSIS — L71.0 PERIORAL DERMATITIS: ICD-10-CM

## 2019-03-19 DIAGNOSIS — L56.4 POLYMORPHOUS LIGHT ERUPTION: ICD-10-CM

## 2019-03-19 DIAGNOSIS — L80 VITILIGO: ICD-10-CM

## 2019-03-19 PROBLEM — L30.9 DERMATITIS, UNSPECIFIED: Status: ACTIVE | Noted: 2019-03-19

## 2019-03-19 PROCEDURE — ? COUNSELING

## 2019-03-19 PROCEDURE — 99214 OFFICE O/P EST MOD 30 MIN: CPT

## 2019-03-19 PROCEDURE — ? PRESCRIPTION

## 2019-03-19 PROCEDURE — ? PATIENT SPECIFIC COUNSELING

## 2019-03-19 RX ORDER — TRIAMCINOLONE ACETONIDE 1 MG/G
OINTMENT TOPICAL
Qty: 1 | Refills: 2 | Status: ERX | COMMUNITY
Start: 2019-03-19

## 2019-03-19 RX ORDER — PIMECROLIMUS 10 MG/G
CREAM TOPICAL BID
Qty: 1 | Refills: 2 | Status: ERX | COMMUNITY
Start: 2019-03-19

## 2019-03-19 RX ADMIN — TRIAMCINOLONE ACETONIDE: 1 OINTMENT TOPICAL at 17:22

## 2019-03-19 RX ADMIN — PIMECROLIMUS: 10 CREAM TOPICAL at 17:23

## 2019-03-19 NOTE — PROCEDURE: PATIENT SPECIFIC COUNSELING
Detail Level: Simple
We discussed the data (small case series) reporting improvement with microneedling; he is currently using a thin needle (0.25) which is likely safe (epidermal only).  We will continue to monitor.
I counseled the patient to apply triamcinolone to the trunk 1 week before leaving to [place] locations. If his symptoms do not improve with this, I recommended he return since his symptoms may be related to an allergic reaction to sunscreen.

## 2019-06-18 ENCOUNTER — APPOINTMENT (RX ONLY)
Age: 29
Setting detail: DERMATOLOGY
End: 2019-06-18

## 2019-06-18 DIAGNOSIS — L64.8 OTHER ANDROGENIC ALOPECIA: ICD-10-CM

## 2019-06-18 PROCEDURE — ? PATIENT SPECIFIC COUNSELING

## 2019-07-10 ENCOUNTER — APPOINTMENT (RX ONLY)
Age: 29
Setting detail: DERMATOLOGY
End: 2019-07-10

## 2019-07-10 DIAGNOSIS — L64.8 OTHER ANDROGENIC ALOPECIA: ICD-10-CM

## 2019-07-10 DIAGNOSIS — L259 CONTACT DERMATITIS AND OTHER ECZEMA, UNSPECIFIED CAUSE: ICD-10-CM

## 2019-07-10 PROBLEM — L30.8 OTHER SPECIFIED DERMATITIS: Status: ACTIVE | Noted: 2019-07-10

## 2019-07-10 PROCEDURE — 99214 OFFICE O/P EST MOD 30 MIN: CPT

## 2019-07-10 PROCEDURE — ? DIAGNOSIS COMMENT

## 2019-07-10 PROCEDURE — ? COUNSELING

## 2019-07-10 PROCEDURE — ? OBSERVATION

## 2019-07-10 PROCEDURE — ? PRESCRIPTION

## 2019-07-10 RX ORDER — FINASTERIDE 5 MG/1
TABLET, FILM COATED ORAL
Qty: 24 | Refills: 3 | Status: ERX | COMMUNITY
Start: 2019-07-10

## 2019-07-10 RX ORDER — BETAMETHASONE DIPROPIONATE 0.5 MG/G
OINTMENT, AUGMENTED TOPICAL
Qty: 1 | Refills: 2 | Status: ERX | COMMUNITY
Start: 2019-07-10

## 2019-07-10 RX ADMIN — BETAMETHASONE DIPROPIONATE: 0.5 OINTMENT, AUGMENTED TOPICAL at 19:17

## 2019-07-10 RX ADMIN — FINASTERIDE: 5 TABLET, FILM COATED ORAL at 19:07

## 2019-07-10 ASSESSMENT — LOCATION DETAILED DESCRIPTION DERM: LOCATION DETAILED: HAIR

## 2019-07-10 ASSESSMENT — LOCATION ZONE DERM: LOCATION ZONE: SCALP

## 2019-07-10 ASSESSMENT — LOCATION SIMPLE DESCRIPTION DERM: LOCATION SIMPLE: HAIR

## 2019-07-10 NOTE — HPI: HAIR LOSS
How Did The Hair Loss Occur?: gradual in onset
How Severe Is Your Hair Loss?: mild
What Hair Products Do You Use?: GHT Blocking shampoo

## 2019-07-10 NOTE — PROCEDURE: DIAGNOSIS COMMENT
Detail Level: Zone
Comment: He is doing every possible treatment and is well versed on the potential side effects and is very motivated to continue finasteride

## 2019-08-21 ENCOUNTER — APPOINTMENT (RX ONLY)
Age: 29
Setting detail: DERMATOLOGY
End: 2019-08-21

## 2019-08-21 DIAGNOSIS — L80 VITILIGO: ICD-10-CM | Status: IMPROVED

## 2019-08-21 DIAGNOSIS — L57.8 OTHER SKIN CHANGES DUE TO CHRONIC EXPOSURE TO NONIONIZING RADIATION: ICD-10-CM

## 2019-08-21 DIAGNOSIS — L64.8 OTHER ANDROGENIC ALOPECIA: ICD-10-CM | Status: IMPROVED

## 2019-08-21 DIAGNOSIS — L259 CONTACT DERMATITIS AND OTHER ECZEMA, UNSPECIFIED CAUSE: ICD-10-CM | Status: IMPROVED

## 2019-08-21 PROBLEM — L30.8 OTHER SPECIFIED DERMATITIS: Status: ACTIVE | Noted: 2019-08-21

## 2019-08-21 PROCEDURE — ? PATIENT SPECIFIC COUNSELING

## 2019-08-21 PROCEDURE — ? PRESCRIPTION

## 2019-08-21 PROCEDURE — ? OBSERVATION

## 2019-08-21 PROCEDURE — ? COUNSELING

## 2019-08-21 PROCEDURE — 99213 OFFICE O/P EST LOW 20 MIN: CPT

## 2019-08-21 RX ORDER — FINASTERIDE 5 MG/1
TABLET, FILM COATED ORAL
Qty: 24 | Refills: 3

## 2019-08-21 ASSESSMENT — LOCATION ZONE DERM: LOCATION ZONE: SCALP

## 2019-08-21 ASSESSMENT — LOCATION SIMPLE DESCRIPTION DERM: LOCATION SIMPLE: HAIR

## 2019-08-21 ASSESSMENT — LOCATION DETAILED DESCRIPTION DERM: LOCATION DETAILED: HAIR

## 2019-08-21 NOTE — PROCEDURE: PATIENT SPECIFIC COUNSELING
Detail Level: Zone
After physical examination, I noted an increase in hair growth. The patient is tolerating the medication well. I discussed how the purpose of finasteride is to maintain his hair, not to increase his hair growth.
uncertain etiology, resolving, RTC if recurs for further workup

## 2020-03-24 ENCOUNTER — RX ONLY (OUTPATIENT)
Age: 30
Setting detail: RX ONLY
End: 2020-03-24

## 2020-03-24 RX ORDER — PIMECROLIMUS 10 MG/G
CREAM TOPICAL BID
Qty: 1 | Refills: 2 | Status: ERX

## 2020-05-06 ENCOUNTER — APPOINTMENT (RX ONLY)
Age: 30
Setting detail: DERMATOLOGY
End: 2020-05-06

## 2020-05-06 DIAGNOSIS — L80 VITILIGO: ICD-10-CM | Status: WORSENING

## 2020-05-06 PROCEDURE — 99214 OFFICE O/P EST MOD 30 MIN: CPT

## 2020-05-06 PROCEDURE — ? PATIENT SPECIFIC COUNSELING

## 2020-05-06 NOTE — HPI: FREE FORM (INITIAL HISTORY)
What Brings You In Today? (This Is An Xx Year Old Patient Who Presents For...): Vitiligo —maybe anemic, stopped zinc

## 2020-05-06 NOTE — PROCEDURE: PATIENT SPECIFIC COUNSELING
Filled out paperwork from [facility] for home phototherapy
Detail Level: Zone
we discussed surgical options, I am not aware of anyone in [place] who does it but he could go to [place] or other [facility] that have pioneered these treatments

## 2021-05-06 ENCOUNTER — APPOINTMENT (RX ONLY)
Age: 31
Setting detail: DERMATOLOGY
End: 2021-05-06

## 2021-05-06 DIAGNOSIS — L64.8 OTHER ANDROGENIC ALOPECIA: ICD-10-CM

## 2021-05-06 PROCEDURE — ? PRESCRIPTION

## 2021-05-08 RX ORDER — FINASTERIDE 1 MG/1
TABLET, FILM COATED ORAL
Qty: 90 | Refills: 3 | Status: ERX | COMMUNITY
Start: 2021-05-08

## 2021-05-08 RX ADMIN — FINASTERIDE: 1 TABLET, FILM COATED ORAL at 00:00

## 2021-05-24 ENCOUNTER — APPOINTMENT (RX ONLY)
Age: 31
Setting detail: DERMATOLOGY
End: 2021-05-24

## 2021-05-24 VITALS — TEMPERATURE: 97.9 F

## 2021-05-24 DIAGNOSIS — L64.8 OTHER ANDROGENIC ALOPECIA: ICD-10-CM | Status: IMPROVED

## 2021-05-24 DIAGNOSIS — L0391 CELLULITIS AND ABSCESS OF UNSPECIFIED SITES: ICD-10-CM

## 2021-05-24 DIAGNOSIS — Q819 OTHER SPECIFIED ANOMALIES OF SKIN: ICD-10-CM

## 2021-05-24 DIAGNOSIS — Q828 OTHER SPECIFIED ANOMALIES OF SKIN: ICD-10-CM

## 2021-05-24 DIAGNOSIS — Q826 OTHER SPECIFIED ANOMALIES OF SKIN: ICD-10-CM

## 2021-05-24 DIAGNOSIS — L57.8 OTHER SKIN CHANGES DUE TO CHRONIC EXPOSURE TO NONIONIZING RADIATION: ICD-10-CM

## 2021-05-24 DIAGNOSIS — L0390 CELLULITIS AND ABSCESS OF UNSPECIFIED SITES: ICD-10-CM

## 2021-05-24 DIAGNOSIS — L80 VITILIGO: ICD-10-CM | Status: IMPROVED

## 2021-05-24 PROBLEM — L85.8 OTHER SPECIFIED EPIDERMAL THICKENING: Status: ACTIVE | Noted: 2021-05-24

## 2021-05-24 PROBLEM — L02.414 CUTANEOUS ABSCESS OF LEFT UPPER LIMB: Status: ACTIVE | Noted: 2021-05-24

## 2021-05-24 PROCEDURE — ? PATIENT SPECIFIC COUNSELING

## 2021-05-24 PROCEDURE — 99213 OFFICE O/P EST LOW 20 MIN: CPT

## 2021-05-24 PROCEDURE — ? COUNSELING

## 2021-05-24 RX ORDER — BETAMETHASONE DIPROPIONATE 0.5 MG/G
OINTMENT, AUGMENTED TOPICAL
Qty: 1 | Refills: 2 | Status: ERX

## 2021-05-24 ASSESSMENT — LOCATION DETAILED DESCRIPTION DERM: LOCATION DETAILED: LEFT DISTAL DORSAL FOREARM

## 2021-05-24 ASSESSMENT — LOCATION SIMPLE DESCRIPTION DERM: LOCATION SIMPLE: LEFT FOREARM

## 2021-05-24 ASSESSMENT — LOCATION ZONE DERM: LOCATION ZONE: ARM

## 2021-05-24 NOTE — HPI: EVALUATION OF SKIN LESION(S)
What Type Of Note Output Would You Prefer (Optional)?: Bullet Format
Hpi Title: Evaluation of Skin Lesions
How Severe Are Your Spot(S)?: moderate
Have Your Spot(S) Been Treated In The Past?: has not been treated
Additional History: Patient states he has some bumps on the back of his arms and his bottom. Hes been using a pumice scrub. Patient would like a refill of augmented betamethasone.

## 2021-05-24 NOTE — PROCEDURE: COUNSELING
Detail Level: Generalized
Detail Level: Simple
Patient Specific Counseling (Will Not Stick From Patient To Patient): **I recommended that he use CeraVe rough and bumpy moisturizer and to stop using his pumice stone, if not improving, he can call and we will send in script for tretinoin
Patient Specific Counseling (Will Not Stick From Patient To Patient): **If he gets another abscess in the future I directed him to stop by the clinic to get a bacterial culture.

## 2021-06-08 ENCOUNTER — APPOINTMENT (RX ONLY)
Age: 31
Setting detail: DERMATOLOGY
End: 2021-06-08

## 2021-06-08 DIAGNOSIS — L0390 CELLULITIS AND ABSCESS OF UNSPECIFIED SITES: ICD-10-CM

## 2021-06-08 DIAGNOSIS — L0391 CELLULITIS AND ABSCESS OF UNSPECIFIED SITES: ICD-10-CM

## 2021-06-08 PROBLEM — L02.414 CUTANEOUS ABSCESS OF LEFT UPPER LIMB: Status: ACTIVE | Noted: 2021-06-08

## 2021-06-08 PROCEDURE — ? INCISION AND DRAINAGE

## 2021-06-08 PROCEDURE — 99212 OFFICE O/P EST SF 10 MIN: CPT | Mod: 25

## 2021-06-08 PROCEDURE — ? PRESCRIPTION

## 2021-06-08 PROCEDURE — ? ORDER TESTS

## 2021-06-08 PROCEDURE — 10060 I&D ABSCESS SIMPLE/SINGLE: CPT

## 2021-06-08 RX ORDER — SULFAMETHOXAZOLE AND TRIMETHOPRIM 800; 160 MG/1; MG/1
TABLET ORAL
Qty: 14 | Refills: 0 | Status: ERX | COMMUNITY
Start: 2021-06-08

## 2021-06-08 RX ADMIN — SULFAMETHOXAZOLE AND TRIMETHOPRIM: 800; 160 TABLET ORAL at 00:00

## 2021-06-08 ASSESSMENT — LOCATION DETAILED DESCRIPTION DERM: LOCATION DETAILED: LEFT DISTAL DORSAL FOREARM

## 2021-06-08 ASSESSMENT — LOCATION ZONE DERM: LOCATION ZONE: ARM

## 2021-06-08 ASSESSMENT — LOCATION SIMPLE DESCRIPTION DERM: LOCATION SIMPLE: LEFT FOREARM

## 2021-06-08 NOTE — PROCEDURE: INCISION AND DRAINAGE
Consent was obtained after risks were reviewed including but not limited to delayed wound healing, infection, need for multiple I and D's, and pain.
Anesthesia Volume In Cc: 10
Include Sutures?: No
Drainage Amount?: minimal
Lesion Type: Abscess
Epidermal Closure: simple interrupted
Preparation Text: The area was prepped with chlorhexidine scrub.
Post-Care Instructions: After Incision & Drainage of an abscess\\n\\nAn abscess (sometimes called a “boil”) occurs when bacteria get trapped under the skin and begin to grow. Pus forms inside the abscess as the body responds to the bacteria. An abscess can occur with an insect bite, ingrown hair, blocked oil gland, pimple, cyst, or puncture wound.\\nTreatment of your abscess has required an incision to drain the pus. If the abscess pocket was large, gauze packing may have been inserted. This will need to be removed and possibly replaced on your next visit. Antibiotics are not required in the treatment of a simple abscess, unless the infection is spreading into the skin around the wound (known as “cellulitis”).\\nHealing of the wound will take about one to two weeks depending on the size of the abscess. Healthy tissue will grow from the bottom and sides of the opening until it seals over.\\n\\nHome Care:\\nThe wound may drain for the first two days. Cover the wound with a clean dry dressing. If the dressing becomes soaked with blood or pus, change it.\\nIf a gauze packing was placed inside the abscess cavity, you may be advised to remove it yourself (only if instructed by staff) You may do this in the shower. Once the packing is removed, you should wash the area in the shower or bath 3 to 4 times a day, until the skin opening has closed.\\nIf you were prescribed antibiotics, take them as directed until they are all gone.\\nYou may use acetaminophen (Tylenol) or ibuprofen (Motrin, Advil) to control pain, unless another pain medicine was prescribed. \\n***NOTE: If you have liver disease or ever had a stomach ulcer, talk with your doctor before using these medicines.\\n\\nFollow Up:\\nFollow up in our clinic as advised by our staff. If a gauze packing was inserted in your wound, it should be removed in 1-3 days. Check your wound every day for the signs of worsening (see list below).\\nGet prompt medical attention if any of the following occur:\\nIncreasing redness or swelling\\nRed streaks in the skin leading away from the wound\\nIncreasing local pain or swelling\\nContinued pus draining from the wound two days after treatment\\nFever of 100.4ºF (38ºC) or higher, or as directed by your healthcare provider
Size Of Lesion In Cm (Optional But May Be Required For Some Insurances): 2
Suture Text: The incision was partially closed with
Epidermal Sutures: 4-0 Ethilon
Wound Care: Aquaphor
Drainage Type?: purulent
Method: 11 blade
Dressing: dry sterile dressing
Detail Level: Detailed

## 2021-07-14 ENCOUNTER — APPOINTMENT (RX ONLY)
Age: 31
Setting detail: DERMATOLOGY
End: 2021-07-14

## 2021-07-14 DIAGNOSIS — I78.8 OTHER DISEASES OF CAPILLARIES: ICD-10-CM

## 2021-07-14 DIAGNOSIS — L81.4 OTHER MELANIN HYPERPIGMENTATION: ICD-10-CM

## 2021-07-14 DIAGNOSIS — Z86.14 PERSONAL HISTORY OF METHICILLIN RESISTANT STAPHYLOCOCCUS AUREUS INFECTION: ICD-10-CM

## 2021-07-14 DIAGNOSIS — L80 VITILIGO: ICD-10-CM | Status: IMPROVED

## 2021-07-14 PROCEDURE — ? COUNSELING

## 2021-07-14 PROCEDURE — ? TREATMENT REGIMEN

## 2021-07-14 PROCEDURE — 99213 OFFICE O/P EST LOW 20 MIN: CPT

## 2021-07-14 PROCEDURE — ? PRESCRIPTION

## 2021-07-14 PROCEDURE — ? PATIENT SPECIFIC COUNSELING

## 2021-07-14 RX ORDER — MUPIROCIN 20 MG/G
OINTMENT TOPICAL
Qty: 1 | Refills: 2 | Status: ERX | COMMUNITY
Start: 2021-07-14

## 2021-07-14 RX ADMIN — MUPIROCIN: 20 OINTMENT TOPICAL at 00:00

## 2021-07-14 NOTE — PROCEDURE: PATIENT SPECIFIC COUNSELING
Wash axillae, groin, and buttocks once a month with Hibiclens to prevent recurrence of staph.
Detail Level: Detailed
We discussed different cosmetic products to treat and prevent lentigos. Option of treating them with ln2. I recommended a vitamin C serum from Obagi, Skinceuticals, or Kiehls. We discussed the Obagi C Clarifying Serum, though I recommended that he wait until he is older to start this. I gave him samples of Elta MD sunscreen.

## 2021-07-14 NOTE — PROCEDURE: COUNSELING
Patient Specific Counseling (Will Not Stick From Patient To Patient): **We discussed that laser therapy is the only Tx option for these.
Detail Level: Simple
Detail Level: Detailed

## 2021-08-05 ENCOUNTER — RX ONLY (OUTPATIENT)
Age: 31
Setting detail: RX ONLY
End: 2021-08-05

## 2021-08-05 RX ORDER — SULFAMETHOXAZOLE AND TRIMETHOPRIM 800; 160 MG/1; MG/1
TABLET ORAL
Qty: 14 | Refills: 2 | Status: ERX | COMMUNITY
Start: 2021-08-05

## 2023-03-16 ENCOUNTER — APPOINTMENT (RX ONLY)
Age: 33
Setting detail: DERMATOLOGY
End: 2023-03-16

## 2023-05-02 ENCOUNTER — APPOINTMENT (RX ONLY)
Age: 33
Setting detail: DERMATOLOGY
End: 2023-05-02

## 2023-05-02 DIAGNOSIS — L80 VITILIGO: ICD-10-CM

## 2023-05-02 PROCEDURE — ? COUNSELING

## 2023-05-02 PROCEDURE — 99214 OFFICE O/P EST MOD 30 MIN: CPT

## 2023-05-02 PROCEDURE — ? PRESCRIPTION

## 2023-05-02 RX ORDER — BETAMETHASONE DIPROPIONATE 0.5 MG/G
OINTMENT, AUGMENTED TOPICAL
Qty: 15 | Refills: 0 | Status: ERX

## 2023-05-02 RX ORDER — RUXOLITINIB 15 MG/G
CREAM TOPICAL
Qty: 60 | Refills: 1 | Status: ERX | COMMUNITY
Start: 2023-05-02

## 2023-05-02 RX ADMIN — RUXOLITINIB: 15 CREAM TOPICAL at 00:00

## 2023-05-02 NOTE — PROCEDURE: COUNSELING
Detail Level: Simple
Patient Specific Counseling (Will Not Stick From Patient To Patient): I recommended Opzelura to the patient, he is amenable to this. I will work on approval for Opzelura given the psychological impact Vitiligo has on the patient. We discussed the use of phototherapy and its possible risks and benefits. He would like to continue Daavlin use at this point in time.

## 2023-11-27 ENCOUNTER — APPOINTMENT (RX ONLY)
Age: 33
Setting detail: DERMATOLOGY
End: 2023-11-27

## 2023-11-27 DIAGNOSIS — L64.8 OTHER ANDROGENIC ALOPECIA: ICD-10-CM

## 2023-11-27 PROCEDURE — ? PRESCRIPTION

## 2023-11-27 RX ORDER — FINASTERIDE 1 MG/1
TABLET, FILM COATED ORAL
Qty: 90 | Refills: 3 | Status: ERX | COMMUNITY
Start: 2023-11-27

## 2023-11-27 RX ADMIN — FINASTERIDE: 1 TABLET, FILM COATED ORAL at 00:00

## 2023-11-30 ENCOUNTER — RX ONLY (OUTPATIENT)
Age: 33
Setting detail: RX ONLY
End: 2023-11-30

## 2023-11-30 RX ORDER — RUXOLITINIB 15 MG/G
CREAM TOPICAL
Qty: 60 | Refills: 1 | Status: ERX | COMMUNITY
Start: 2023-11-30

## 2024-04-18 NOTE — PROCEDURE: PATIENT SPECIFIC COUNSELING
Her/She
Detail Level: Detailed
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 help
Continue oral finasteride.
**I refilled his betamethasone today, he knows to use sparingly